# Patient Record
Sex: MALE | Race: WHITE
[De-identification: names, ages, dates, MRNs, and addresses within clinical notes are randomized per-mention and may not be internally consistent; named-entity substitution may affect disease eponyms.]

---

## 2018-06-06 ENCOUNTER — HOSPITAL ENCOUNTER (EMERGENCY)
Dept: HOSPITAL 17 - PHED | Age: 33
Discharge: HOME | End: 2018-06-06
Payer: SELF-PAY

## 2018-06-06 VITALS
OXYGEN SATURATION: 98 % | RESPIRATION RATE: 16 BRPM | HEART RATE: 66 BPM | DIASTOLIC BLOOD PRESSURE: 65 MMHG | SYSTOLIC BLOOD PRESSURE: 110 MMHG

## 2018-06-06 VITALS — HEIGHT: 64 IN | WEIGHT: 158.73 LBS | BODY MASS INDEX: 27.1 KG/M2

## 2018-06-06 VITALS
DIASTOLIC BLOOD PRESSURE: 72 MMHG | SYSTOLIC BLOOD PRESSURE: 119 MMHG | OXYGEN SATURATION: 98 % | RESPIRATION RATE: 16 BRPM | TEMPERATURE: 98.3 F | HEART RATE: 87 BPM

## 2018-06-06 DIAGNOSIS — F17.200: ICD-10-CM

## 2018-06-06 DIAGNOSIS — R20.2: Primary | ICD-10-CM

## 2018-06-06 DIAGNOSIS — F12.90: ICD-10-CM

## 2018-06-06 PROCEDURE — 99284 EMERGENCY DEPT VISIT MOD MDM: CPT

## 2018-06-06 PROCEDURE — 72050 X-RAY EXAM NECK SPINE 4/5VWS: CPT

## 2018-06-06 PROCEDURE — 93005 ELECTROCARDIOGRAM TRACING: CPT

## 2018-06-06 NOTE — RADRPT
EXAM DATE:  6/6/2018 1:58 PM EDT

AGE/SEX:        33 years / Male



INDICATIONS:  Right arm tingling & numbness with no known injury.



CLINICAL DATA:  This is the patient's initial encounter. Patient reports that signs and symptoms have
 been present for 2 weeks and indicates a pain score of 0/10. 

                                                                          

MEDICAL/SURGICAL HISTORY:       None. . Pharyngeal flap & mastoid surgery.



COMPARISON:      No prior [facility] exams available for comparison. 





FINDINGS: No appreciable subluxation or soft tissue swelling is seen.  Disc spaces are well maintaine
d.  The neural foramina are patent bilaterally. There is diffuse osteopenia. 



CONCLUSION:  Unremarkable study except for osteopenia.  



Electronically signed by: MARIA FERNANDA Mcdowell MD  6/6/2018 2:14 PM EDT

## 2018-06-06 NOTE — PD
HPI


Chief Complaint:  Numbness/Tingling


Time Seen by Provider:  13:33


Travel History


International Travel<30 days:  No


Contact w/Intl Traveler<30days:  No


Traveled to known affect area:  No





History of Present Illness


HPI


This 33-year-old male is complaining of tingling in his right arm.  He says 

this is been going on for about 2 weeks.  It is intermittent.  He says it only 

lasts 15-20 seconds and can usually occurs about twice a day.  Sometimes it 

seems to be triggered by certain movements of his arm.  He has not noted any 

weakness in the arm.  He does not have headaches.  His legs have not been 

affected.  He works as a  and has not had any effect on his work.  

There is no history of trauma.





PFSH


Past Medical History


Hx Anticoagulant Therapy:  No


Diabetes:  No


Diminished Hearing:  No


Pregnant?:  Not Pregnant





Past Surgical History


Ear Surgery:  Yes (SEVERAL EAR SURGERIES AND THROAT SUREGERY AS A CHILD)


Other Surgery:  Yes (PHARYNGEAL FLAP, MASTOID SX)





Social History


Alcohol Use:  Yes (socially)


Tobacco Use:  Yes (1 ppd)


Substance Use:  Yes (marijuana)





Allergies-Medications


(Allergen,Severity, Reaction):  


Coded Allergies:  


     No Known Allergies (Verified , 11/10/16)


Reported Meds & Prescriptions





Reported Meds & Active Scripts


Active


Flexeril (Cyclobenzaprine HCl) 10 Mg Tab 10 Mg PO TID


Naproxen 500 Mg Tab 500 Mg PO BID PRN








Review of Systems


Except as stated in HPI:  all other systems reviewed are Neg


General / Constitutional:  No: Fever, Chills


Eyes:  No: Diploplia


HENT:  No: Headaches


Respiratory:  No: Cough, Shortness of Breath


Gastrointestinal:  No: Vomiting


Musculoskeletal:  No: Weakness


Neurologic:  Positive: Sensory Disturbance


Psychiatric:  No: Anxiety, Depression


Hematologic/Lymphatic:  No: Easy Bruising





Physical Exam


Narrative


GENERAL: Well-developed male


SKIN: Focused skin assessment warm/dry.


HEAD: Atraumatic. Normocephalic. 


EYES: Pupils equal and round. No scleral icterus. No injection or drainage. 


ENT: No nasal bleeding or discharge.  Mucous membranes pink and moist.


NECK: Trachea midline. No JVD.  There is no tenderness to palpation of the 

posterior neck


CARDIOVASCULAR: Regular rate and rhythm.  No murmur appreciated.


RESPIRATORY: No accessory muscle use. Clear to auscultation. Breath sounds 

equal bilaterally. 


GASTROINTESTINAL: Abdomen soft, non-tender, nondistended. Hepatic and splenic 

margins not palpable. 


MUSCULOSKELETAL: No obvious deformities. No clubbing.  No cyanosis.  No edema. 


NEUROLOGICAL: Awake and alert. No obvious cranial nerve deficits.  Motor 

grossly within normal limits. Normal speech.  There is no sensory deficit on 

the right arm.  Nerves of the hand were checked and appear to be intact


PSYCHIATRIC: Appropriate mood and affect; insight and judgment normal.





Data


Data


Last Documented VS





Vital Signs








  Date Time  Temp Pulse Resp B/P (MAP) Pulse Ox O2 Delivery O2 Flow Rate FiO2


 


6/6/18 13:12 98.3 87 16 119/72 (88) 98   








Orders





 Orders


Spine, Cervical Compl([id number]) (6/6/18 13:39)








[facility]


Medical Decision Making


Medical Screen Exam Complete:  Yes


Emergency Medical Condition:  Yes


Medical Record Reviewed:  Yes


Differential Diagnosis


Differential includes carpal tunnel, radiculopathy, paresthesias


Narrative Course


Symptoms seem to involve the entire arm the time of my exam there is no 

deficit.  X-ray of the neck shows osteopenia but is otherwise negative.  

Patient is stable for discharge.





Diagnosis





 Primary Impression:  


 Arm paresthesia, right





***Additional Instructions:  


Follow-up with neurologist if symptoms persist


Disposition:  01 DISCHARGE HOME


Condition:  Stable











Paddy Tubbs MD Jun 6, 2018 13:43

## 2018-06-07 NOTE — EKG
Date Performed: 06/06/2018       Time Performed: 13:19:08

 

PTAGE:      33 years

 

EKG:      Sinus rhythm 

 

 WITH SINUS ARRHYTHMIA NORMAL ECG INTERPRETATION BASED ON A DEFAULT AGE OF 39 YEARS 

 

NO PREVIOUS TRACING            

 

DOCTOR:   Tammy Loomis  Interpretating Date/Time  06/07/2018 15:13:59